# Patient Record
Sex: FEMALE | URBAN - METROPOLITAN AREA
[De-identification: names, ages, dates, MRNs, and addresses within clinical notes are randomized per-mention and may not be internally consistent; named-entity substitution may affect disease eponyms.]

---
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
## 2024-10-16 NOTE — PROCEDURE: MEDICATION COUNSELING
Spoke with pt, who would like to come in on 10/18/24 9:20 AM to see Dr. Bhat.  Cx appt with Celina.   Cephalexin Pregnancy And Lactation Text: This medication is Pregnancy Category B and considered safe during pregnancy.  It is also excreted in breast milk but can be used safely for shorter doses.